# Patient Record
Sex: FEMALE | Race: ASIAN | NOT HISPANIC OR LATINO | Employment: FULL TIME | URBAN - METROPOLITAN AREA
[De-identification: names, ages, dates, MRNs, and addresses within clinical notes are randomized per-mention and may not be internally consistent; named-entity substitution may affect disease eponyms.]

---

## 2019-09-11 ENCOUNTER — RECORDS - HEALTHEAST (OUTPATIENT)
Dept: LAB | Facility: CLINIC | Age: 34
End: 2019-09-11

## 2019-09-11 LAB
CHOLEST SERPL-MCNC: 229 MG/DL
FASTING STATUS PATIENT QL REPORTED: NO
HDLC SERPL-MCNC: 89 MG/DL
LDLC SERPL CALC-MCNC: 118 MG/DL
TRIGL SERPL-MCNC: 108 MG/DL

## 2019-09-12 LAB
HPV SOURCE: NORMAL
HUMAN PAPILLOMA VIRUS 16 DNA: NEGATIVE
HUMAN PAPILLOMA VIRUS 18 DNA: NEGATIVE
HUMAN PAPILLOMA VIRUS FINAL DIAGNOSIS: NORMAL
HUMAN PAPILLOMA VIRUS OTHER HR: NEGATIVE
SPECIMEN DESCRIPTION: NORMAL

## 2022-01-13 ENCOUNTER — LAB REQUISITION (OUTPATIENT)
Dept: LAB | Facility: CLINIC | Age: 37
End: 2022-01-13
Payer: COMMERCIAL

## 2022-01-13 DIAGNOSIS — Z03.818 ENCOUNTER FOR OBSERVATION FOR SUSPECTED EXPOSURE TO OTHER BIOLOGICAL AGENTS RULED OUT: ICD-10-CM

## 2022-01-13 PROCEDURE — U0005 INFEC AGEN DETEC AMPLI PROBE: HCPCS | Mod: ORL | Performed by: PHYSICIAN ASSISTANT

## 2022-01-14 LAB — SARS-COV-2 RNA RESP QL NAA+PROBE: POSITIVE

## 2022-03-24 ENCOUNTER — LAB REQUISITION (OUTPATIENT)
Dept: LAB | Facility: CLINIC | Age: 37
End: 2022-03-24

## 2022-03-24 DIAGNOSIS — Z13.6 ENCOUNTER FOR SCREENING FOR CARDIOVASCULAR DISORDERS: ICD-10-CM

## 2022-03-24 DIAGNOSIS — Z13.1 ENCOUNTER FOR SCREENING FOR DIABETES MELLITUS: ICD-10-CM

## 2022-03-24 LAB
CHOLEST SERPL-MCNC: 184 MG/DL
FASTING STATUS PATIENT QL REPORTED: NORMAL
FASTING STATUS PATIENT QL REPORTED: NORMAL
GLUCOSE BLD-MCNC: 93 MG/DL (ref 70–125)
HDLC SERPL-MCNC: 72 MG/DL
LDLC SERPL CALC-MCNC: 85 MG/DL
TRIGL SERPL-MCNC: 135 MG/DL

## 2022-03-24 PROCEDURE — 80061 LIPID PANEL: CPT | Performed by: FAMILY MEDICINE

## 2022-03-24 PROCEDURE — 82947 ASSAY GLUCOSE BLOOD QUANT: CPT | Performed by: FAMILY MEDICINE

## 2023-11-06 ENCOUNTER — ANCILLARY PROCEDURE (OUTPATIENT)
Dept: MAMMOGRAPHY | Facility: CLINIC | Age: 38
End: 2023-11-06
Attending: FAMILY MEDICINE
Payer: COMMERCIAL

## 2023-11-06 ENCOUNTER — CARE COORDINATION (OUTPATIENT)
Dept: SURGERY | Facility: CLINIC | Age: 38
End: 2023-11-06
Payer: COMMERCIAL

## 2023-11-06 DIAGNOSIS — N63.0 LUMP OR MASS IN BREAST: ICD-10-CM

## 2023-11-06 PROCEDURE — 76642 ULTRASOUND BREAST LIMITED: CPT | Mod: LT

## 2023-11-06 PROCEDURE — 77066 DX MAMMO INCL CAD BI: CPT

## 2023-11-06 PROCEDURE — G0279 TOMOSYNTHESIS, MAMMO: HCPCS

## 2023-11-06 NOTE — PROGRESS NOTES
Per Breast Radiologist request, scheduled patient to see Dr. Kwong  for a surgical consult on 11-13-23 at the [facility].  Patient given RN contact information for questions or concerns.    
30-Apr-2021

## 2023-11-08 ENCOUNTER — ANCILLARY PROCEDURE (OUTPATIENT)
Dept: MAMMOGRAPHY | Facility: CLINIC | Age: 38
End: 2023-11-08
Attending: FAMILY MEDICINE
Payer: COMMERCIAL

## 2023-11-08 DIAGNOSIS — N63.0 LUMP OR MASS IN BREAST: ICD-10-CM

## 2023-11-08 PROCEDURE — 88305 TISSUE EXAM BY PATHOLOGIST: CPT | Mod: 26 | Performed by: PATHOLOGY

## 2023-11-08 PROCEDURE — A4648 IMPLANTABLE TISSUE MARKER: HCPCS

## 2023-11-08 PROCEDURE — 250N000009 HC RX 250

## 2023-11-08 PROCEDURE — 272N000431 US BREAST BIOPSY CORE NEEDLE LEFT

## 2023-11-08 PROCEDURE — 999N000065 MA POST PROCEDURE LEFT

## 2023-11-08 PROCEDURE — 88305 TISSUE EXAM BY PATHOLOGIST: CPT | Mod: TC

## 2023-11-08 RX ORDER — NORELGESTROMIN AND ETHINYL ESTRADIOL 35; 150 UG/D; UG/D
PATCH TRANSDERMAL
COMMUNITY
Start: 2023-10-03

## 2023-11-08 RX ORDER — FLUTICASONE PROPIONATE 50 MCG
SPRAY, SUSPENSION (ML) NASAL
COMMUNITY
Start: 2023-03-21

## 2023-11-08 RX ADMIN — LIDOCAINE HYDROCHLORIDE 10 ML: 10 INJECTION, SOLUTION INFILTRATION; PERINEURAL at 10:58

## 2023-11-09 NOTE — PROGRESS NOTES
"History:  This is a 38 year old female who I'm asked to see by Dr. Islas for evaluation of a left breast mass.  This was picked up by the patient.  She found it about 3 weeks ago.  She denies having any pain, skin changes, or nipple discharge.  She underwent diagnostic mammogram and ultrasound.  This was the first time she had ever had a mammogram.  The imaging demonstrated a 2 cm benign-appearing mass.  Needle biopsy demonstrated a fibroadenoma.    Allergies:  No Known Allergies    Past medical history:  Denies    Past surgical history:  Retropectoral breast implants    Medications:    fluticasone (FLONASE) 50 MCG/ACT nasal spray, 1 spray in each nostril Nasally Once a day for 30 days, Disp: , Rfl:     ZAFEMY 150-35 MCG/24HR patch, CHANGE & APPLY 1 PATCH ONTO THE SKIN ONCE WEEKLY FOR 3 WEEKS, THEN OFF FOR 1 WEEK*, Disp: , Rfl:     Family history:  Maternal grandmother had breast cancer.  Denies any other family history of cancers.    Social history:  Denies alcohol, tobacco, marijuana, and illicit drug use.    Review of Systems:  General: No complaints or constitutional symptoms  Skin: No complaints or symptoms   Hematologic/Lymphatic: No symptoms or complaints  Psychiatric: No symptoms or complaints  Endocrine: No excessive fatigue, no hypermetabolic symptoms reported  Respiratory: No cough, shortness of breath, or wheezing  Cardiovascular: No chest pain or dyspnea on exertion  Breast: Left breast mass  Gastrointestinal: No abdominal pain, nausea, diarrhea, or constipation  Musculoskeletal: No recent injuries reported  Neurological: No focal neurologic defects reported.      Physical Exam:  Ht 1.499 m (4' 11\")   Wt 57.6 kg (127 lb)   BMI 25.65 kg/m    General: Alert, cooperative, appears stated age   Skin: Skin color, texture, turgor normal, no rashes or lesions   Lymphatic: No obvious adenopathy, no swelling   Eyes: No scleral icterus, pupils equal  HENT: No traumatic injury to the head or face, no gross " abnormalities  Lungs: Normal respiratory effort, breath sounds equal bilaterally  Heart: Regular rate and rhythm  Breasts: Bilateral transverse incisions just above the inframammary fold from breast augmentation.  Left breast 2:00 zone 2 has a 1.5 cm superficial mobile mass consistent with a fibroadenoma.  There is ecchymosis lateral to this.  Abdomen: Soft, non-distended and non-tender to palpation  Neurologic: Grossly intact    Imaging:  Pertinent images personally reviewed by myself and discussed with the patient.    Radiology reports:  MA DIAGNOSTIC WITH IMPLANTS BILATERAL W/ ALLYSON AND LEFT BREAST ULTRASOUND  11/6/2023 11:19 AM CST     INDICATION: Left breast lump  COMPARISON: None     MAMMOGRAPHIC FINDINGS: Bilateral full-field digital diagnostic mammograms performed. The breasts are extremely dense, which lowers the sensitivity of mammography. Routine and implant displaced views were obtained with a BB marker placed by the patient location of the lump in the left breast in the 2:00 position in the mid to posterior depth. There are subpectoral implants bilaterally. The right breast appears benign. Directly under the BB in the left breast there is a circumscribed mass. Images evaluated with the assistance of CAD. Breast tomosynthesis was used in interpretation.     ULTRASOUND FINDINGS: Targeted left breast ultrasound was performed in the location of the palpable lump as identified by the patient, in the 2:00 position, 6 cm from the nipple. In this location there was a hypoechoic, homogeneous, circumscribed mass with parallel orientation and increased through transmission measuring 20 x 21 x 12 mm correlating with palpable abnormality and with the lesion seen on the patient's mammogram. The left axilla was examined as well with no evidence of lymphadenopathy.                                                                    IMPRESSION:   Left breast correlates with palpable abnormality in the abnormality seen on  the patient's mammogram. The appearance is most consistent with a benign fibroadenoma. However, given the size, and the patient's age, ultrasound-guided core biopsy for tissue diagnosis is recommended.     ACR BI-RADS Category 4A: Suspicious. Low Suspicion for Malignancy.     The findings and the recommendations were discussed with the patient at the time of exam. She would also like to have the lesion removed. We are helping her with scheduling ultrasound-guided core biopsy of the left breast mass and surgical consultation.    My interpretation:  Well circumscribed benign appearing mass within the US images    Pathology:  LEFT BREAST, 2 O'CLOCK POSITION, 6 CM FROM NIPPLE, ULTRASOUND-GUIDED NEEDLE CORE BIOPSY:  -FIBROADENOMA  -BLUE INK IS CONFIRMED  -NO EVIDENCE OF ATYPIA OR MALIGNANCY    IMPRESSION:  Left breast mass.      PLAN:   Discussed the treatment options for a breast mass of her size.  It would be reasonable to remove this lesion to help rule out an underlying carcinoma or phyllodes tumor.  This is typically an outpatient procedure under local MAC anesthetic.  The risks and benefits of surgery were explained.  Also talked about expected recovery time.  Further treatment will be dependent upon the final pathology.  She is most certainly interested in having this lesion removed.  We'll schedule a left breast excisional biopsy at her earliest convenience.    Ranjana Kwong DO  General Surgeon  [facility]  Breast [facility]  [address] [place] [id number]  Office: [phone]  Employed by - [facility]

## 2023-11-09 NOTE — H&P (VIEW-ONLY)
"History:  This is a 38 year old female who I'm asked to see by Dr. Islas for evaluation of a left breast mass.  This was picked up by the patient.  She found it about 3 weeks ago.  She denies having any pain, skin changes, or nipple discharge.  She underwent diagnostic mammogram and ultrasound.  This was the first time she had ever had a mammogram.  The imaging demonstrated a 2 cm benign-appearing mass.  Needle biopsy demonstrated a fibroadenoma.    Allergies:  No Known Allergies    Past medical history:  Denies    Past surgical history:  Retropectoral breast implants    Medications:    fluticasone (FLONASE) 50 MCG/ACT nasal spray, 1 spray in each nostril Nasally Once a day for 30 days, Disp: , Rfl:     ZAFEMY 150-35 MCG/24HR patch, CHANGE & APPLY 1 PATCH ONTO THE SKIN ONCE WEEKLY FOR 3 WEEKS, THEN OFF FOR 1 WEEK*, Disp: , Rfl:     Family history:  Maternal grandmother had breast cancer.  Denies any other family history of cancers.    Social history:  Denies alcohol, tobacco, marijuana, and illicit drug use.    Review of Systems:  General: No complaints or constitutional symptoms  Skin: No complaints or symptoms   Hematologic/Lymphatic: No symptoms or complaints  Psychiatric: No symptoms or complaints  Endocrine: No excessive fatigue, no hypermetabolic symptoms reported  Respiratory: No cough, shortness of breath, or wheezing  Cardiovascular: No chest pain or dyspnea on exertion  Breast: Left breast mass  Gastrointestinal: No abdominal pain, nausea, diarrhea, or constipation  Musculoskeletal: No recent injuries reported  Neurological: No focal neurologic defects reported.      Physical Exam:  Ht 1.499 m (4' 11\")   Wt 57.6 kg (127 lb)   BMI 25.65 kg/m    General: Alert, cooperative, appears stated age   Skin: Skin color, texture, turgor normal, no rashes or lesions   Lymphatic: No obvious adenopathy, no swelling   Eyes: No scleral icterus, pupils equal  HENT: No traumatic injury to the head or face, no gross " abnormalities  Lungs: Normal respiratory effort, breath sounds equal bilaterally  Heart: Regular rate and rhythm  Breasts: Bilateral transverse incisions just above the inframammary fold from breast augmentation.  Left breast 2:00 zone 2 has a 1.5 cm superficial mobile mass consistent with a fibroadenoma.  There is ecchymosis lateral to this.  Abdomen: Soft, non-distended and non-tender to palpation  Neurologic: Grossly intact    Imaging:  Pertinent images personally reviewed by myself and discussed with the patient.    Radiology reports:  MA DIAGNOSTIC WITH IMPLANTS BILATERAL W/ ALLYSON AND LEFT BREAST ULTRASOUND  11/6/2023 11:19 AM CST     INDICATION: Left breast lump  COMPARISON: None     MAMMOGRAPHIC FINDINGS: Bilateral full-field digital diagnostic mammograms performed. The breasts are extremely dense, which lowers the sensitivity of mammography. Routine and implant displaced views were obtained with a BB marker placed by the patient location of the lump in the left breast in the 2:00 position in the mid to posterior depth. There are subpectoral implants bilaterally. The right breast appears benign. Directly under the BB in the left breast there is a circumscribed mass. Images evaluated with the assistance of CAD. Breast tomosynthesis was used in interpretation.     ULTRASOUND FINDINGS: Targeted left breast ultrasound was performed in the location of the palpable lump as identified by the patient, in the 2:00 position, 6 cm from the nipple. In this location there was a hypoechoic, homogeneous, circumscribed mass with parallel orientation and increased through transmission measuring 20 x 21 x 12 mm correlating with palpable abnormality and with the lesion seen on the patient's mammogram. The left axilla was examined as well with no evidence of lymphadenopathy.                                                                    IMPRESSION:   Left breast correlates with palpable abnormality in the abnormality seen on  the patient's mammogram. The appearance is most consistent with a benign fibroadenoma. However, given the size, and the patient's age, ultrasound-guided core biopsy for tissue diagnosis is recommended.     ACR BI-RADS Category 4A: Suspicious. Low Suspicion for Malignancy.     The findings and the recommendations were discussed with the patient at the time of exam. She would also like to have the lesion removed. We are helping her with scheduling ultrasound-guided core biopsy of the left breast mass and surgical consultation.    My interpretation:  Well circumscribed benign appearing mass within the US images    Pathology:  LEFT BREAST, 2 O'CLOCK POSITION, 6 CM FROM NIPPLE, ULTRASOUND-GUIDED NEEDLE CORE BIOPSY:  -FIBROADENOMA  -BLUE INK IS CONFIRMED  -NO EVIDENCE OF ATYPIA OR MALIGNANCY    IMPRESSION:  Left breast mass.      PLAN:   Discussed the treatment options for a breast mass of her size.  It would be reasonable to remove this lesion to help rule out an underlying carcinoma or phyllodes tumor.  This is typically an outpatient procedure under local MAC anesthetic.  The risks and benefits of surgery were explained.  Also talked about expected recovery time.  Further treatment will be dependent upon the final pathology.  She is most certainly interested in having this lesion removed.  We'll schedule a left breast excisional biopsy at her earliest convenience.    Ranjana Kwong DO  General Surgeon  [facility]  Breast [facility]  [address] [place] [address]  Office: [phone]  Employed by - [facility]

## 2023-11-10 ENCOUNTER — TELEPHONE (OUTPATIENT)
Dept: MAMMOGRAPHY | Facility: CLINIC | Age: 38
End: 2023-11-10
Payer: COMMERCIAL

## 2023-11-10 LAB
PATH REPORT.COMMENTS IMP SPEC: NORMAL
PATH REPORT.FINAL DX SPEC: NORMAL
PATH REPORT.GROSS SPEC: NORMAL
PATH REPORT.MICROSCOPIC SPEC OTHER STN: NORMAL
PATH REPORT.RELEVANT HX SPEC: NORMAL
PHOTO IMAGE: NORMAL

## 2023-11-10 NOTE — TELEPHONE ENCOUNTER
Called patient to inform her of  breast biopsy results from 11/8/23 and recommended follow up from radiologist. No answer.  Left message for patient to call me back.    Ines Bucio, RN, BSN, PHN, CBCN  Breast Center Imaging Nurse Coordinator   [facility]  [address]  [place] [address]  [phone]

## 2023-11-13 ENCOUNTER — OFFICE VISIT (OUTPATIENT)
Dept: SURGERY | Facility: CLINIC | Age: 38
End: 2023-11-13
Attending: FAMILY MEDICINE
Payer: COMMERCIAL

## 2023-11-13 VITALS — HEIGHT: 59 IN | WEIGHT: 127 LBS | BODY MASS INDEX: 25.6 KG/M2

## 2023-11-13 DIAGNOSIS — N63.21 MASS OF UPPER OUTER QUADRANT OF LEFT BREAST: Primary | ICD-10-CM

## 2023-11-13 PROCEDURE — 99213 OFFICE O/P EST LOW 20 MIN: CPT | Performed by: SURGERY

## 2023-11-13 PROCEDURE — 99204 OFFICE O/P NEW MOD 45 MIN: CPT | Performed by: SURGERY

## 2023-11-13 NOTE — LETTER
"    11/13/2023         RE: Yovani Ruff  [address] [address]        Dear Colleague,    Thank you for referring your patient, Yovani Ruff, to the [facility] [place]. Please see a copy of my visit note below.    History:  This is a 38 year old female who I'm asked to see by Dr. Islas for evaluation of a left breast mass.  This was picked up by the patient.  She found it about 3 weeks ago.  She denies having any pain, skin changes, or nipple discharge.  She underwent diagnostic mammogram and ultrasound.  This was the first time she had ever had a mammogram.  The imaging demonstrated a 2 cm benign-appearing mass.  Needle biopsy demonstrated a fibroadenoma.    Allergies:  No Known Allergies    Past medical history:  Denies    Past surgical history:  Retropectoral breast implants    Medications:     fluticasone (FLONASE) 50 MCG/ACT nasal spray, 1 spray in each nostril Nasally Once a day for 30 days, Disp: , Rfl:      ZAFEMY 150-35 MCG/24HR patch, CHANGE & APPLY 1 PATCH ONTO THE SKIN ONCE WEEKLY FOR 3 WEEKS, THEN OFF FOR 1 WEEK*, Disp: , Rfl:     Family history:  Maternal grandmother had breast cancer.  Denies any other family history of cancers.    Social history:  Denies alcohol, tobacco, marijuana, and illicit drug use.    Review of Systems:  General: No complaints or constitutional symptoms  Skin: No complaints or symptoms   Hematologic/Lymphatic: No symptoms or complaints  Psychiatric: No symptoms or complaints  Endocrine: No excessive fatigue, no hypermetabolic symptoms reported  Respiratory: No cough, shortness of breath, or wheezing  Cardiovascular: No chest pain or dyspnea on exertion  Breast: Left breast mass  Gastrointestinal: No abdominal pain, nausea, diarrhea, or constipation  Musculoskeletal: No recent injuries reported  Neurological: No focal neurologic defects reported.      Physical Exam:  Ht 1.499 m (4' 11\")   Wt 57.6 kg (127 lb)   BMI 25.65 kg/m    General: Alert, " cooperative, appears stated age   Skin: Skin color, texture, turgor normal, no rashes or lesions   Lymphatic: No obvious adenopathy, no swelling   Eyes: No scleral icterus, pupils equal  HENT: No traumatic injury to the head or face, no gross abnormalities  Lungs: Normal respiratory effort, breath sounds equal bilaterally  Heart: Regular rate and rhythm  Breasts: Bilateral transverse incisions just above the inframammary fold from breast augmentation.  Left breast 2:00 zone 2 has a 1.5 cm superficial mobile mass consistent with a fibroadenoma.  There is ecchymosis lateral to this.  Abdomen: Soft, non-distended and non-tender to palpation  Neurologic: Grossly intact    Imaging:  Pertinent images personally reviewed by myself and discussed with the patient.    Radiology reports:  MA DIAGNOSTIC WITH IMPLANTS BILATERAL W/ ALLYSON AND LEFT BREAST ULTRASOUND  11/6/2023 11:19 AM CST     INDICATION: Left breast lump  COMPARISON: None     MAMMOGRAPHIC FINDINGS: Bilateral full-field digital diagnostic mammograms performed. The breasts are extremely dense, which lowers the sensitivity of mammography. Routine and implant displaced views were obtained with a BB marker placed by the patient location of the lump in the left breast in the 2:00 position in the mid to posterior depth. There are subpectoral implants bilaterally. The right breast appears benign. Directly under the BB in the left breast there is a circumscribed mass. Images evaluated with the assistance of CAD. Breast tomosynthesis was used in interpretation.     ULTRASOUND FINDINGS: Targeted left breast ultrasound was performed in the location of the palpable lump as identified by the patient, in the 2:00 position, 6 cm from the nipple. In this location there was a hypoechoic, homogeneous, circumscribed mass with parallel orientation and increased through transmission measuring 20 x 21 x 12 mm correlating with palpable abnormality and with the lesion seen on the patient's  mammogram. The left axilla was examined as well with no evidence of lymphadenopathy.                                                                    IMPRESSION:   Left breast correlates with palpable abnormality in the abnormality seen on the patient's mammogram. The appearance is most consistent with a benign fibroadenoma. However, given the size, and the patient's age, ultrasound-guided core biopsy for tissue diagnosis is recommended.     ACR BI-RADS Category 4A: Suspicious. Low Suspicion for Malignancy.     The findings and the recommendations were discussed with the patient at the time of exam. She would also like to have the lesion removed. We are helping her with scheduling ultrasound-guided core biopsy of the left breast mass and surgical consultation.    My interpretation:  Well circumscribed benign appearing mass within the US images    Pathology:  LEFT BREAST, 2 O'CLOCK POSITION, 6 CM FROM NIPPLE, ULTRASOUND-GUIDED NEEDLE CORE BIOPSY:  -FIBROADENOMA  -BLUE INK IS CONFIRMED  -NO EVIDENCE OF ATYPIA OR MALIGNANCY    IMPRESSION:  Left breast mass.      PLAN:   Discussed the treatment options for a breast mass of her size.  It would be reasonable to remove this lesion to help rule out an underlying carcinoma or phyllodes tumor.  This is typically an outpatient procedure under local MAC anesthetic.  The risks and benefits of surgery were explained.  Also talked about expected recovery time.  Further treatment will be dependent upon the final pathology.  She is most certainly interested in having this lesion removed.  We'll schedule a left breast excisional biopsy at her earliest convenience.    Ranjana Kwong DO  General Surgeon  [facility]  Breast [facility]  [address] [place] [address]  Office: [phone]  Employed by - [facility]      Again, thank you for allowing me to participate in the care of your patient.         Sincerely,        Ranjana Kwong, DO

## 2023-11-13 NOTE — NURSING NOTE
Yovani presents to [facility] for a surgical consult with Dr. Kwong  regarding a biopsy proven fibroadenoma. She would like this removed.   RN assessment and EMR update.  Patient met with Dr. Kwong .  See dictation for details of visit. She will plan surgical excision of lesion.  Pre and post op teaching, written and verbal, provided to patient.  Walked her to Kamar/Luis Manuel for surgery scheduling.  Follow up pending biopsy results.

## 2023-11-14 PROBLEM — N63.21 MASS OF UPPER OUTER QUADRANT OF LEFT BREAST: Status: ACTIVE | Noted: 2023-11-13

## 2023-11-17 ENCOUNTER — ANESTHESIA EVENT (OUTPATIENT)
Dept: SURGERY | Facility: AMBULATORY SURGERY CENTER | Age: 38
End: 2023-11-17
Payer: COMMERCIAL

## 2023-11-20 ENCOUNTER — ANESTHESIA (OUTPATIENT)
Dept: SURGERY | Facility: AMBULATORY SURGERY CENTER | Age: 38
End: 2023-11-20
Payer: COMMERCIAL

## 2023-11-20 ENCOUNTER — HOSPITAL ENCOUNTER (OUTPATIENT)
Facility: AMBULATORY SURGERY CENTER | Age: 38
Discharge: HOME | End: 2023-11-20
Attending: SURGERY
Payer: COMMERCIAL

## 2023-11-20 VITALS
SYSTOLIC BLOOD PRESSURE: 108 MMHG | BODY MASS INDEX: 25.6 KG/M2 | WEIGHT: 127 LBS | OXYGEN SATURATION: 98 % | DIASTOLIC BLOOD PRESSURE: 67 MMHG | RESPIRATION RATE: 16 BRPM | HEIGHT: 59 IN | TEMPERATURE: 97.6 F | HEART RATE: 66 BPM

## 2023-11-20 DIAGNOSIS — N63.21 MASS OF UPPER OUTER QUADRANT OF LEFT BREAST: ICD-10-CM

## 2023-11-20 PROCEDURE — 19120 REMOVAL OF BREAST LESION: CPT | Mod: LT | Performed by: SURGERY

## 2023-11-20 RX ORDER — ONDANSETRON 2 MG/ML
4 INJECTION INTRAMUSCULAR; INTRAVENOUS EVERY 30 MIN PRN
Status: DISCONTINUED | OUTPATIENT
Start: 2023-11-20 | End: 2023-11-21 | Stop reason: HOSPADM

## 2023-11-20 RX ORDER — KETOROLAC TROMETHAMINE 15 MG/ML
15 INJECTION, SOLUTION INTRAMUSCULAR; INTRAVENOUS
Status: DISCONTINUED | OUTPATIENT
Start: 2023-11-20 | End: 2023-11-21 | Stop reason: HOSPADM

## 2023-11-20 RX ORDER — HYDROMORPHONE HCL IN WATER/PF 6 MG/30 ML
0.2 PATIENT CONTROLLED ANALGESIA SYRINGE INTRAVENOUS EVERY 5 MIN PRN
Status: DISCONTINUED | OUTPATIENT
Start: 2023-11-20 | End: 2023-11-21 | Stop reason: HOSPADM

## 2023-11-20 RX ORDER — SODIUM CHLORIDE, SODIUM LACTATE, POTASSIUM CHLORIDE, CALCIUM CHLORIDE 600; 310; 30; 20 MG/100ML; MG/100ML; MG/100ML; MG/100ML
INJECTION, SOLUTION INTRAVENOUS CONTINUOUS
Status: DISCONTINUED | OUTPATIENT
Start: 2023-11-20 | End: 2023-11-21 | Stop reason: HOSPADM

## 2023-11-20 RX ORDER — PROPOFOL 10 MG/ML
INJECTION, EMULSION INTRAVENOUS CONTINUOUS PRN
Status: DISCONTINUED | OUTPATIENT
Start: 2023-11-20 | End: 2023-11-20

## 2023-11-20 RX ORDER — DEXAMETHASONE SODIUM PHOSPHATE 4 MG/ML
INJECTION, SOLUTION INTRA-ARTICULAR; INTRALESIONAL; INTRAMUSCULAR; INTRAVENOUS; SOFT TISSUE PRN
Status: DISCONTINUED | OUTPATIENT
Start: 2023-11-20 | End: 2023-11-20

## 2023-11-20 RX ORDER — ONDANSETRON 2 MG/ML
INJECTION INTRAMUSCULAR; INTRAVENOUS PRN
Status: DISCONTINUED | OUTPATIENT
Start: 2023-11-20 | End: 2023-11-20

## 2023-11-20 RX ORDER — OXYCODONE HYDROCHLORIDE 5 MG/1
5 TABLET ORAL
Status: DISCONTINUED | OUTPATIENT
Start: 2023-11-20 | End: 2023-11-21 | Stop reason: HOSPADM

## 2023-11-20 RX ORDER — ONDANSETRON 4 MG/1
4 TABLET, ORALLY DISINTEGRATING ORAL EVERY 30 MIN PRN
Status: DISCONTINUED | OUTPATIENT
Start: 2023-11-20 | End: 2023-11-21 | Stop reason: HOSPADM

## 2023-11-20 RX ORDER — HYDROMORPHONE HCL IN WATER/PF 6 MG/30 ML
0.4 PATIENT CONTROLLED ANALGESIA SYRINGE INTRAVENOUS EVERY 5 MIN PRN
Status: DISCONTINUED | OUTPATIENT
Start: 2023-11-20 | End: 2023-11-21 | Stop reason: HOSPADM

## 2023-11-20 RX ORDER — GLYCOPYRROLATE 0.2 MG/ML
INJECTION, SOLUTION INTRAMUSCULAR; INTRAVENOUS PRN
Status: DISCONTINUED | OUTPATIENT
Start: 2023-11-20 | End: 2023-11-20

## 2023-11-20 RX ORDER — FENTANYL CITRATE 0.05 MG/ML
50 INJECTION, SOLUTION INTRAMUSCULAR; INTRAVENOUS EVERY 5 MIN PRN
Status: DISCONTINUED | OUTPATIENT
Start: 2023-11-20 | End: 2023-11-21 | Stop reason: HOSPADM

## 2023-11-20 RX ORDER — PROPOFOL 10 MG/ML
INJECTION, EMULSION INTRAVENOUS PRN
Status: DISCONTINUED | OUTPATIENT
Start: 2023-11-20 | End: 2023-11-20

## 2023-11-20 RX ORDER — FENTANYL CITRATE 50 UG/ML
INJECTION, SOLUTION INTRAMUSCULAR; INTRAVENOUS PRN
Status: DISCONTINUED | OUTPATIENT
Start: 2023-11-20 | End: 2023-11-20

## 2023-11-20 RX ORDER — FENTANYL CITRATE 0.05 MG/ML
25 INJECTION, SOLUTION INTRAMUSCULAR; INTRAVENOUS EVERY 5 MIN PRN
Status: DISCONTINUED | OUTPATIENT
Start: 2023-11-20 | End: 2023-11-21 | Stop reason: HOSPADM

## 2023-11-20 RX ORDER — LIDOCAINE 40 MG/G
CREAM TOPICAL
Status: DISCONTINUED | OUTPATIENT
Start: 2023-11-20 | End: 2023-11-21 | Stop reason: HOSPADM

## 2023-11-20 RX ORDER — MEPERIDINE HYDROCHLORIDE 25 MG/ML
12.5 INJECTION INTRAMUSCULAR; INTRAVENOUS; SUBCUTANEOUS EVERY 5 MIN PRN
Status: DISCONTINUED | OUTPATIENT
Start: 2023-11-20 | End: 2023-11-21 | Stop reason: HOSPADM

## 2023-11-20 RX ORDER — OXYCODONE HYDROCHLORIDE 10 MG/1
10 TABLET ORAL
Status: DISCONTINUED | OUTPATIENT
Start: 2023-11-20 | End: 2023-11-21 | Stop reason: HOSPADM

## 2023-11-20 RX ORDER — ACETAMINOPHEN 325 MG/1
975 TABLET ORAL ONCE
Status: COMPLETED | OUTPATIENT
Start: 2023-11-20 | End: 2023-11-20

## 2023-11-20 RX ORDER — LIDOCAINE HYDROCHLORIDE 20 MG/ML
INJECTION, SOLUTION INFILTRATION; PERINEURAL PRN
Status: DISCONTINUED | OUTPATIENT
Start: 2023-11-20 | End: 2023-11-20

## 2023-11-20 RX ADMIN — ONDANSETRON 4 MG: 2 INJECTION INTRAMUSCULAR; INTRAVENOUS at 14:15

## 2023-11-20 RX ADMIN — DEXAMETHASONE SODIUM PHOSPHATE 6 MG: 4 INJECTION, SOLUTION INTRA-ARTICULAR; INTRALESIONAL; INTRAMUSCULAR; INTRAVENOUS; SOFT TISSUE at 14:15

## 2023-11-20 RX ADMIN — PROPOFOL 20 MG: 10 INJECTION, EMULSION INTRAVENOUS at 14:16

## 2023-11-20 RX ADMIN — FENTANYL CITRATE 50 MCG: 50 INJECTION, SOLUTION INTRAMUSCULAR; INTRAVENOUS at 14:30

## 2023-11-20 RX ADMIN — ACETAMINOPHEN 975 MG: 325 TABLET ORAL at 13:17

## 2023-11-20 RX ADMIN — Medication 100 MCG: at 14:36

## 2023-11-20 RX ADMIN — SODIUM CHLORIDE, SODIUM LACTATE, POTASSIUM CHLORIDE, CALCIUM CHLORIDE: 600; 310; 30; 20 INJECTION, SOLUTION INTRAVENOUS at 14:51

## 2023-11-20 RX ADMIN — SODIUM CHLORIDE, SODIUM LACTATE, POTASSIUM CHLORIDE, CALCIUM CHLORIDE: 600; 310; 30; 20 INJECTION, SOLUTION INTRAVENOUS at 13:16

## 2023-11-20 RX ADMIN — PROPOFOL 175 MCG/KG/MIN: 10 INJECTION, EMULSION INTRAVENOUS at 14:15

## 2023-11-20 RX ADMIN — FENTANYL CITRATE 25 MCG: 50 INJECTION, SOLUTION INTRAMUSCULAR; INTRAVENOUS at 14:25

## 2023-11-20 RX ADMIN — LIDOCAINE HYDROCHLORIDE 50 MG: 20 INJECTION, SOLUTION INFILTRATION; PERINEURAL at 14:15

## 2023-11-20 RX ADMIN — GLYCOPYRROLATE 0.2 MG: 0.2 INJECTION, SOLUTION INTRAMUSCULAR; INTRAVENOUS at 14:15

## 2023-11-20 RX ADMIN — Medication 100 MCG: at 14:31

## 2023-11-20 RX ADMIN — FENTANYL CITRATE 25 MCG: 50 INJECTION, SOLUTION INTRAMUSCULAR; INTRAVENOUS at 14:34

## 2023-11-20 RX ADMIN — PROPOFOL 10 MG: 10 INJECTION, EMULSION INTRAVENOUS at 14:18

## 2023-11-20 NOTE — INTERVAL H&P NOTE
Patient seen in preop.  Denies new medical history since he was last seen.  All questions answered regarding procedure.  Consent obtained.  To the OR for left breast excisional biopsy.    Ranjana Kwong DO  General Surgeon  [facility]  Breast [place] - [address] [address]  Office: [phone]  Employed by - [facility]

## 2023-11-20 NOTE — ANESTHESIA PREPROCEDURE EVALUATION
"Anesthesia Pre-Procedure Evaluation    Patient: Yovani Ruff   MRN: [id number] : 1985        Procedure : Procedure(s):  BREAST EXCISIONAL BIOPSY          History reviewed. No pertinent past medical history.   History reviewed. No pertinent surgical history.   No Known Allergies   Social History     Tobacco Use    Smoking status: Never     Passive exposure: Never    Smokeless tobacco: Never   Substance Use Topics    Alcohol use: Yes     Comment: rarely      Wt Readings from Last 1 Encounters:   23 57.6 kg (127 lb)        Anesthesia Evaluation            ROS/MED HX  ENT/Pulmonary:  - neg pulmonary ROS     Neurologic:  - neg neurologic ROS     Cardiovascular:  - neg cardiovascular ROS     METS/Exercise Tolerance: >4 METS    Hematologic:  - neg hematologic  ROS     Musculoskeletal:  - neg musculoskeletal ROS     GI/Hepatic:  - neg GI/hepatic ROS     Renal/Genitourinary:  - neg Renal ROS     Endo:  - neg endo ROS     Psychiatric/Substance Use:  - neg psychiatric ROS     Infectious Disease:  - neg infectious disease ROS     Malignancy:  - neg malignancy ROS     Other:  - neg other ROS          Physical Exam    Airway  airway exam normal      Mallampati: III   TM distance: > 3 FB   Neck ROM: full   Mouth opening: > 3 cm    Respiratory Devices and Support         Dental       (+) Completely normal teeth      Cardiovascular   cardiovascular exam normal          Pulmonary   pulmonary exam normal                OUTSIDE LABS:  CBC: No results found for: \"WBC\", \"HGB\", \"HCT\", \"PLT\"  BMP:   Lab Results   Component Value Date    GLC 93 2022     COAGS: No results found for: \"PTT\", \"INR\", \"FIBR\"  POC: No results found for: \"BGM\", \"HCG\", \"HCGS\"  HEPATIC: No results found for: \"ALBUMIN\", \"PROTTOTAL\", \"ALT\", \"AST\", \"GGT\", \"ALKPHOS\", \"BILITOTAL\", \"BILIDIRECT\", \"CORY\"  OTHER: No results found for: \"PH\", \"LACT\", \"A1C\", \"CUONG\", \"PHOS\", \"MAG\", \"LIPASE\", \"AMYLASE\", \"TSH\", \"T4\", \"T3\", \"CRP\", \"SED\"    Anesthesia Plan    ASA " Status:  1       Anesthesia Type: MAC.     - Reason for MAC: straight local not clinically adequate, immobility needed   Induction: Intravenous, Propofol.   Maintenance: TIVA.        Consents    Anesthesia Plan(s) and associated risks, benefits, and realistic alternatives discussed. Questions answered and patient/representative(s) expressed understanding.     - Discussed: Risks, Benefits and Alternatives for BOTH SEDATION and the PROCEDURE were discussed     - Discussed with:  Patient            Postoperative Care    Pain management: IV analgesics.   PONV prophylaxis: Ondansetron (or other 5HT-3), Dexamethasone or Solumedrol     Comments:    Other Comments: MAC - propofol gtt, fentanyl/ketamine bolus PRN for pain  Decadron/Zofran for antiemesis  Convert to General with LMA if unable to maintain adequate SpO2 on reduced FiO2 (<30%)  Fire precautions  Reviewed anesthetic options and risks. Patient agrees to proceed              Wayne Garcia MD

## 2023-11-20 NOTE — DISCHARGE INSTRUCTIONS
You have received 975 mg of Acetaminophen (Tylenol) at 1:17PM. Please do not take an additional dose of Tylenol until after 7:17PM     Do not exceed 4,000 mg of acetaminophen during a 24 hour period and keep in mind that acetaminophen can also be found in many over-the-counter cold medications as well as narcotics that may be given for pain.        If you have any questions or concerns regarding your procedure, please contact Dr. Ranjana Kwong, her office number is [phone].     [facility]   Adult Discharge Orders & Instructions     For 24 hours after surgery    Get plenty of rest.  A responsible adult must stay with you for at least 24 hours after you leave the hospital.   Do not drive or use heavy equipment.  If you have weakness or tingling, don't drive or use heavy equipment until this feeling goes away.  Do not drink alcohol.  Avoid strenuous or risky activities.  Ask for help when climbing stairs.   You may feel lightheaded.  IF so, sit for a few minutes before standing.  Have someone help you get up.   If you have nausea (feel sick to your stomach): Drink only clear liquids such as apple juice, ginger ale, broth or 7-Up.  Rest may also help.  Be sure to drink enough fluids.  Move to a regular diet as you feel able.  You may have a slight fever. Call the doctor if your fever is over 100 F (37.7 C) (taken under the tongue) or lasts longer than 24 hours.  You may have a dry mouth, a sore throat, muscle aches or trouble sleeping.  These should go away after 24 hours.  Do not make important or legal decisions.   Call your doctor for any of the followin.  Signs of infection (fever, growing tenderness at the surgery site, a large amount of drainage or bleeding, severe pain, foul-smelling drainage, redness, swelling).    2. It has been over 8 to 10 hours since surgery and you are still not able to urinate (pass water).    3.  Headache for over 24 hours.

## 2023-11-20 NOTE — ANESTHESIA POSTPROCEDURE EVALUATION
Patient: Yovani Ruff    Procedure: Procedure(s):  BREAST EXCISIONAL BIOPSY       Anesthesia Type:  MAC    Note:  Disposition: Outpatient   Postop Pain Control: Uneventful            Sign Out: Well controlled pain   PONV: No   Neuro/Psych: Uneventful            Sign Out: Acceptable/Baseline neuro status   Airway/Respiratory: Uneventful            Sign Out: Acceptable/Baseline resp. status   CV/Hemodynamics: Uneventful            Sign Out: Acceptable CV status; No obvious hypovolemia; No obvious fluid overload   Other NRE: NONE   DID A NON-ROUTINE EVENT OCCUR? No           Last vitals:  Vitals Value Taken Time   /67 11/20/23 1515   Temp 97.6  F (36.4  C) 11/20/23 1451   Pulse 68 11/20/23 1519   Resp 16 11/20/23 1451   SpO2 98 % 11/20/23 1519   Vitals shown include unfiled device data.    Electronically Signed By: Wayne Garcia MD  November 20, 2023  3:23 PM

## 2023-11-20 NOTE — PROGRESS NOTES
Offered patient pregnancy test.  Explained there are risks associated with anesthesia and pregnancy.  Patient verbalizes understanding, denies possibility of pregnancy and declines pregnancy test.    Veronica Campuzano RN on 11/20/2023 at 1:45 PM

## 2023-11-20 NOTE — OP NOTE
Name:  Yovani Ruff  PCP:  Timothy Michelle  Procedure Date:  11/20/2023      LEFT BREAST EXCISIONAL BIOPSY      Pre-Procedure Diagnosis:  Mass of upper outer quadrant of left breast     Post-Procedure Diagnosis:    Same    Anesthesia Type:    MAC with local    Estimated Blood Loss:   2 mL    Specimens:    Left breast mass    Complications:    None apparent    Indication for procedure:  This is a 38-year-old female who recently felt a mass within the left breast.  Her work-up demonstrated findings consistent with a fibroadenoma.  In order to rule out an underlying phyllodes tumor, and prevent continued growth, she has elected for removal of this lesion.    Operative Report:    After informed consent was obtained, and the risks and benefits of the procedure were discussed, the patient was brought back to the operative suite and placed in the supine position.  MAC anesthesia was provided by the department of anesthesia.  A timeout was performed and the left breast and axilla were prepped and draped in the usual sterile fashion.  1% lidocaine mixed with quarter percent Marcaine was injected over the palpable lesion in the far upper outer quadrant.  A curvilinear incision was made directly over the mass at 2:00 6 cm from the nipple.  Electrocautery was used to dissect through the subcutaneous tissues and around the palpable mass.  The mass appeared to be a fibroadenoma.  The mass was sent off for permanent sectioning.  Hemostasis was assured within the wound.  The wound was then closed in a layered fashion utilizing interrupted 3-0 Vicryl for the breast tissue, interrupted 4-0 Vicryl for the deep dermis, followed by a running subcuticular 4-0 Monocryl and Dermabond to the skin.    Disposition:  The patient tolerated the procedure well, and was transferred to the postanesthesia care unit in stable condition.      Ranjana Kwong DO  General Surgeon  [facility]  [address]  [address]  [place] [address]  Office: [phone]  Employed by [facility]

## 2023-11-20 NOTE — ANESTHESIA CARE TRANSFER NOTE
Patient: Yovani Ruff    Procedure: Procedure(s):  BREAST EXCISIONAL BIOPSY       Diagnosis: Mass of upper outer quadrant of left breast [N63.21]  Diagnosis Additional Information: No value filed.    Anesthesia Type:   MAC     Note:    Oropharynx: oropharynx clear of all foreign objects and spontaneously breathing  Level of Consciousness: awake  Oxygen Supplementation: face mask  Level of Supplemental Oxygen (L/min / FiO2): 6  Independent Airway: airway patency satisfactory and stable  Dentition: dentition unchanged  Vital Signs Stable: post-procedure vital signs reviewed and stable  Report to RN Given: handoff report given  Patient transferred to: Phase II    Handoff Report: Identifed the Patient, Identified the Reponsible Provider, Reviewed the pertinent medical history, Discussed the surgical course, Reviewed Intra-OP anesthesia mangement and issues during anesthesia, Set expectations for post-procedure period and Allowed opportunity for questions and acknowledgement of understanding  Vitals:  Vitals Value Taken Time   BP 97/61 11/20/23 1451   Temp 98.6  F (37  C) 11/20/23 1451   Pulse 75 11/20/23 1451   Resp 12 11/20/23 1451   SpO2 99 % 11/20/23 1451       Electronically Signed By: SHAR Izquierdo CRNA  November 20, 2023  2:52 PM

## 2023-12-30 ENCOUNTER — HEALTH MAINTENANCE LETTER (OUTPATIENT)
Age: 38
End: 2023-12-30

## 2024-02-28 ENCOUNTER — LAB REQUISITION (OUTPATIENT)
Dept: LAB | Facility: CLINIC | Age: 39
End: 2024-02-28

## 2024-02-28 DIAGNOSIS — Z01.419 ENCOUNTER FOR GYNECOLOGICAL EXAMINATION (GENERAL) (ROUTINE) WITHOUT ABNORMAL FINDINGS: ICD-10-CM

## 2024-02-28 DIAGNOSIS — Z00.00 ENCOUNTER FOR GENERAL ADULT MEDICAL EXAMINATION WITHOUT ABNORMAL FINDINGS: ICD-10-CM

## 2024-02-28 PROCEDURE — G0145 SCR C/V CYTO,THINLAYER,RESCR: HCPCS | Performed by: FAMILY MEDICINE

## 2024-02-28 PROCEDURE — 80061 LIPID PANEL: CPT | Performed by: FAMILY MEDICINE

## 2024-02-28 PROCEDURE — 87624 HPV HI-RISK TYP POOLED RSLT: CPT | Performed by: FAMILY MEDICINE

## 2024-02-29 LAB
CHOLEST SERPL-MCNC: 233 MG/DL
FASTING STATUS PATIENT QL REPORTED: ABNORMAL
HDLC SERPL-MCNC: 84 MG/DL
LDLC SERPL CALC-MCNC: 122 MG/DL
NONHDLC SERPL-MCNC: 149 MG/DL
TRIGL SERPL-MCNC: 136 MG/DL

## 2024-03-04 LAB
BKR LAB AP GYN ADEQUACY: NORMAL
BKR LAB AP GYN INTERPRETATION: NORMAL
BKR LAB AP HPV REFLEX: NORMAL
BKR LAB AP LMP: NORMAL
BKR LAB AP PREVIOUS ABNORMAL: NORMAL
PATH REPORT.COMMENTS IMP SPEC: NORMAL
PATH REPORT.COMMENTS IMP SPEC: NORMAL
PATH REPORT.RELEVANT HX SPEC: NORMAL

## 2024-03-05 LAB
HUMAN PAPILLOMA VIRUS 16 DNA: NEGATIVE
HUMAN PAPILLOMA VIRUS 18 DNA: NEGATIVE
HUMAN PAPILLOMA VIRUS FINAL DIAGNOSIS: NORMAL
HUMAN PAPILLOMA VIRUS OTHER HR: NEGATIVE

## 2025-01-19 ENCOUNTER — HEALTH MAINTENANCE LETTER (OUTPATIENT)
Age: 40
End: 2025-01-19

## 2025-08-16 ENCOUNTER — OFFICE VISIT (OUTPATIENT)
Dept: URGENT CARE | Facility: URGENT CARE | Age: 40
End: 2025-08-16
Payer: COMMERCIAL

## 2025-08-16 VITALS
HEART RATE: 74 BPM | RESPIRATION RATE: 16 BRPM | WEIGHT: 136.8 LBS | SYSTOLIC BLOOD PRESSURE: 132 MMHG | TEMPERATURE: 98 F | BODY MASS INDEX: 27.63 KG/M2 | OXYGEN SATURATION: 97 % | DIASTOLIC BLOOD PRESSURE: 95 MMHG
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
## 2025-08-16 PROCEDURE — 99203 OFFICE O/P NEW LOW 30 MIN: CPT | Mod: 25 | Performed by: FAMILY MEDICINE

## 2025-08-16 PROCEDURE — 90471 IMMUNIZATION ADMIN: CPT | Performed by: FAMILY MEDICINE

## 2025-08-16 PROCEDURE — 3075F SYST BP GE 130 - 139MM HG: CPT | Performed by: FAMILY MEDICINE

## 2025-08-16 PROCEDURE — 3080F DIAST BP >= 90 MM HG: CPT | Performed by: FAMILY MEDICINE

## 2025-08-16 PROCEDURE — 90715 TDAP VACCINE 7 YRS/> IM: CPT | Performed by: FAMILY MEDICINE

## 2025-08-20 ENCOUNTER — APPOINTMENT (OUTPATIENT)
Dept: RADIOLOGY | Facility: HOSPITAL | Age: 40
End: 2025-08-20
Payer: COMMERCIAL

## 2025-08-20 ENCOUNTER — OFFICE VISIT (OUTPATIENT)
Dept: URGENT CARE | Facility: URGENT CARE | Age: 40
End: 2025-08-20
Payer: COMMERCIAL

## 2025-08-20 ENCOUNTER — HOSPITAL ENCOUNTER (INPATIENT)
Facility: HOSPITAL | Age: 40
End: 2025-08-20
Attending: EMERGENCY MEDICINE | Admitting: FAMILY MEDICINE
Payer: COMMERCIAL

## 2025-08-20 VITALS
WEIGHT: 136 LBS | DIASTOLIC BLOOD PRESSURE: 90 MMHG | HEIGHT: 59 IN | OXYGEN SATURATION: 96 % | BODY MASS INDEX: 27.42 KG/M2 | SYSTOLIC BLOOD PRESSURE: 130 MMHG | RESPIRATION RATE: 19 BRPM | HEART RATE: 63 BPM | TEMPERATURE: 98.7 F

## 2025-08-20 DIAGNOSIS — M65.949 TENOSYNOVITIS OF HAND: Primary | ICD-10-CM

## 2025-08-20 DIAGNOSIS — L03.113 CELLULITIS OF RIGHT HAND: Primary | ICD-10-CM

## 2025-08-20 DIAGNOSIS — L03.011 CELLULITIS OF FINGER OF RIGHT HAND: ICD-10-CM

## 2025-08-20 LAB
ANION GAP SERPL CALCULATED.3IONS-SCNC: 10 MMOL/L (ref 7–15)
BASOPHILS # BLD AUTO: 0.03 10E3/UL (ref 0–0.2)
BASOPHILS NFR BLD AUTO: 0.3 %
BUN SERPL-MCNC: 11.1 MG/DL (ref 6–20)
CALCIUM SERPL-MCNC: 9.4 MG/DL (ref 8.8–10.4)
CHLORIDE SERPL-SCNC: 102 MMOL/L (ref 98–107)
CREAT SERPL-MCNC: 0.59 MG/DL (ref 0.51–0.95)
CRP SERPL-MCNC: 48.9 MG/L
EGFRCR SERPLBLD CKD-EPI 2021: >90 ML/MIN/1.73M2
EOSINOPHIL # BLD AUTO: 0.09 10E3/UL (ref 0–0.7)
EOSINOPHIL NFR BLD AUTO: 1 %
ERYTHROCYTE [DISTWIDTH] IN BLOOD BY AUTOMATED COUNT: 12 % (ref 10–15)
GLUCOSE SERPL-MCNC: 107 MG/DL (ref 70–99)
HCO3 SERPL-SCNC: 24 MMOL/L (ref 22–29)
HCT VFR BLD AUTO: 41.7 % (ref 35–47)
HGB BLD-MCNC: 14.2 G/DL (ref 11.7–15.7)
IMM GRANULOCYTES # BLD: 0.04 10E3/UL
IMM GRANULOCYTES NFR BLD: 0.5 %
LACTATE SERPL-SCNC: 0.8 MMOL/L (ref 0.7–2)
LYMPHOCYTES # BLD AUTO: 1.35 10E3/UL (ref 0.8–5.3)
LYMPHOCYTES NFR BLD AUTO: 15.4 %
MCH RBC QN AUTO: 29.3 PG (ref 26.5–33)
MCHC RBC AUTO-ENTMCNC: 34.1 G/DL (ref 31.5–36.5)
MCV RBC AUTO: 86.2 FL (ref 78–100)
MONOCYTES # BLD AUTO: 0.91 10E3/UL (ref 0–1.3)
MONOCYTES NFR BLD AUTO: 10.4 %
NEUTROPHILS # BLD AUTO: 6.36 10E3/UL (ref 1.6–8.3)
NEUTROPHILS NFR BLD AUTO: 72.4 %
NRBC # BLD AUTO: <0.03 10E3/UL
NRBC BLD AUTO-RTO: 0 /100
PLATELET # BLD AUTO: 232 10E3/UL (ref 150–450)
POTASSIUM SERPL-SCNC: 4.1 MMOL/L (ref 3.4–5.3)
RBC # BLD AUTO: 4.84 10E6/UL (ref 3.8–5.2)
SODIUM SERPL-SCNC: 136 MMOL/L (ref 135–145)
WBC # BLD AUTO: 8.78 10E3/UL (ref 4–11)

## 2025-08-20 PROCEDURE — 99214 OFFICE O/P EST MOD 30 MIN: CPT | Performed by: PHYSICIAN ASSISTANT

## 2025-08-20 PROCEDURE — 96365 THER/PROPH/DIAG IV INF INIT: CPT

## 2025-08-20 PROCEDURE — 250N000011 HC RX IP 250 OP 636

## 2025-08-20 PROCEDURE — 73130 X-RAY EXAM OF HAND: CPT | Mod: RT

## 2025-08-20 PROCEDURE — 3075F SYST BP GE 130 - 139MM HG: CPT | Performed by: PHYSICIAN ASSISTANT

## 2025-08-20 PROCEDURE — 99285 EMERGENCY DEPT VISIT HI MDM: CPT | Mod: 25

## 2025-08-20 PROCEDURE — 87070 CULTURE OTHR SPECIMN AEROBIC: CPT | Performed by: PHYSICIAN ASSISTANT

## 2025-08-20 PROCEDURE — 3080F DIAST BP >= 90 MM HG: CPT | Performed by: PHYSICIAN ASSISTANT

## 2025-08-20 PROCEDURE — 36415 COLL VENOUS BLD VENIPUNCTURE: CPT

## 2025-08-20 PROCEDURE — 83605 ASSAY OF LACTIC ACID: CPT

## 2025-08-20 PROCEDURE — 87040 BLOOD CULTURE FOR BACTERIA: CPT

## 2025-08-20 PROCEDURE — 85004 AUTOMATED DIFF WBC COUNT: CPT

## 2025-08-20 PROCEDURE — 99222 1ST HOSP IP/OBS MODERATE 55: CPT | Mod: AI

## 2025-08-20 PROCEDURE — 80048 BASIC METABOLIC PNL TOTAL CA: CPT

## 2025-08-20 PROCEDURE — 86140 C-REACTIVE PROTEIN: CPT

## 2025-08-20 PROCEDURE — 120N000001 HC R&B MED SURG/OB

## 2025-08-20 RX ORDER — AMPICILLIN AND SULBACTAM 2; 1 G/1; G/1
3 INJECTION, POWDER, FOR SOLUTION INTRAMUSCULAR; INTRAVENOUS EVERY 6 HOURS
Status: DISCONTINUED | OUTPATIENT
Start: 2025-08-20 | End: 2025-08-22 | Stop reason: HOSPADM

## 2025-08-20 RX ORDER — AMPICILLIN AND SULBACTAM 2; 1 G/1; G/1
3 INJECTION, POWDER, FOR SOLUTION INTRAMUSCULAR; INTRAVENOUS ONCE
Status: COMPLETED | OUTPATIENT
Start: 2025-08-20 | End: 2025-08-20

## 2025-08-20 RX ORDER — CALCIUM CARBONATE 500 MG/1
1000 TABLET, CHEWABLE ORAL 4 TIMES DAILY PRN
Status: DISCONTINUED | OUTPATIENT
Start: 2025-08-20 | End: 2025-08-22 | Stop reason: HOSPADM

## 2025-08-20 RX ORDER — CEFTRIAXONE SODIUM 2 G
0.35 VIAL (EA) INJECTION ONCE
Status: DISCONTINUED | OUTPATIENT
Start: 2025-08-20 | End: 2025-08-20

## 2025-08-20 RX ORDER — SULFAMETHOXAZOLE AND TRIMETHOPRIM 800; 160 MG/1; MG/1
1 TABLET ORAL 2 TIMES DAILY
Qty: 20 TABLET | Refills: 0 | Status: CANCELLED | OUTPATIENT
Start: 2025-08-20 | End: 2025-08-30

## 2025-08-20 RX ORDER — ACETAMINOPHEN 650 MG/1
650 SUPPOSITORY RECTAL EVERY 4 HOURS PRN
Status: DISCONTINUED | OUTPATIENT
Start: 2025-08-20 | End: 2025-08-22 | Stop reason: HOSPADM

## 2025-08-20 RX ORDER — AMOXICILLIN 250 MG
1 CAPSULE ORAL 2 TIMES DAILY PRN
Status: DISCONTINUED | OUTPATIENT
Start: 2025-08-20 | End: 2025-08-22 | Stop reason: HOSPADM

## 2025-08-20 RX ORDER — OXYCODONE HYDROCHLORIDE 5 MG/1
5 TABLET ORAL EVERY 4 HOURS PRN
Refills: 0 | Status: DISCONTINUED | OUTPATIENT
Start: 2025-08-20 | End: 2025-08-22 | Stop reason: HOSPADM

## 2025-08-20 RX ORDER — NALOXONE HYDROCHLORIDE 0.4 MG/ML
0.2 INJECTION, SOLUTION INTRAMUSCULAR; INTRAVENOUS; SUBCUTANEOUS
Status: DISCONTINUED | OUTPATIENT
Start: 2025-08-20 | End: 2025-08-22 | Stop reason: HOSPADM

## 2025-08-20 RX ORDER — LIDOCAINE 40 MG/G
CREAM TOPICAL
Status: DISCONTINUED | OUTPATIENT
Start: 2025-08-20 | End: 2025-08-22 | Stop reason: HOSPADM

## 2025-08-20 RX ORDER — NALOXONE HYDROCHLORIDE 0.4 MG/ML
0.4 INJECTION, SOLUTION INTRAMUSCULAR; INTRAVENOUS; SUBCUTANEOUS
Status: DISCONTINUED | OUTPATIENT
Start: 2025-08-20 | End: 2025-08-22 | Stop reason: HOSPADM

## 2025-08-20 RX ORDER — ACETAMINOPHEN 325 MG/1
650 TABLET ORAL EVERY 4 HOURS PRN
Status: DISCONTINUED | OUTPATIENT
Start: 2025-08-20 | End: 2025-08-22 | Stop reason: HOSPADM

## 2025-08-20 RX ORDER — AMOXICILLIN 250 MG
2 CAPSULE ORAL 2 TIMES DAILY PRN
Status: DISCONTINUED | OUTPATIENT
Start: 2025-08-20 | End: 2025-08-22 | Stop reason: HOSPADM

## 2025-08-20 RX ADMIN — AMPICILLIN SODIUM AND SULBACTAM SODIUM 3 G: 2; 1 INJECTION, POWDER, FOR SOLUTION INTRAMUSCULAR; INTRAVENOUS at 19:39

## 2025-08-20 RX ADMIN — AMPICILLIN SODIUM AND SULBACTAM SODIUM 3 G: 2; 1 INJECTION, POWDER, FOR SOLUTION INTRAMUSCULAR; INTRAVENOUS at 12:41

## 2025-08-20 ASSESSMENT — ACTIVITIES OF DAILY LIVING (ADL)
ADLS_ACUITY_SCORE: 41

## 2025-08-20 ASSESSMENT — COLUMBIA-SUICIDE SEVERITY RATING SCALE - C-SSRS
6. HAVE YOU EVER DONE ANYTHING, STARTED TO DO ANYTHING, OR PREPARED TO DO ANYTHING TO END YOUR LIFE?: NO
1. IN THE PAST MONTH, HAVE YOU WISHED YOU WERE DEAD OR WISHED YOU COULD GO TO SLEEP AND NOT WAKE UP?: NO
2. HAVE YOU ACTUALLY HAD ANY THOUGHTS OF KILLING YOURSELF IN THE PAST MONTH?: NO

## 2025-08-21 ENCOUNTER — APPOINTMENT (OUTPATIENT)
Dept: MRI IMAGING | Facility: HOSPITAL | Age: 40
End: 2025-08-21
Attending: PHYSICIAN ASSISTANT
Payer: COMMERCIAL

## 2025-08-21 VITALS
BODY MASS INDEX: 26.59 KG/M2 | HEART RATE: 65 BPM | TEMPERATURE: 98.6 F | WEIGHT: 131.9 LBS | HEIGHT: 59 IN | DIASTOLIC BLOOD PRESSURE: 79 MMHG | OXYGEN SATURATION: 97 % | SYSTOLIC BLOOD PRESSURE: 127 MMHG | RESPIRATION RATE: 18 BRPM

## 2025-08-21 LAB
ANION GAP SERPL CALCULATED.3IONS-SCNC: 13 MMOL/L (ref 7–15)
BACTERIA SPEC CULT: NORMAL
BACTERIA SPEC CULT: NORMAL
BACTERIA WND CULT: NORMAL
BUN SERPL-MCNC: 8.2 MG/DL (ref 6–20)
CALCIUM SERPL-MCNC: 8.8 MG/DL (ref 8.8–10.4)
CHLORIDE SERPL-SCNC: 102 MMOL/L (ref 98–107)
CREAT SERPL-MCNC: 0.54 MG/DL (ref 0.51–0.95)
CRP SERPL-MCNC: 41.6 MG/L
EGFRCR SERPLBLD CKD-EPI 2021: >90 ML/MIN/1.73M2
ERYTHROCYTE [DISTWIDTH] IN BLOOD BY AUTOMATED COUNT: 12 % (ref 10–15)
GLUCOSE SERPL-MCNC: 95 MG/DL (ref 70–99)
HCO3 SERPL-SCNC: 22 MMOL/L (ref 22–29)
HCT VFR BLD AUTO: 38.3 % (ref 35–47)
HGB BLD-MCNC: 13.6 G/DL (ref 11.7–15.7)
MCH RBC QN AUTO: 30.2 PG (ref 26.5–33)
MCHC RBC AUTO-ENTMCNC: 35.5 G/DL (ref 31.5–36.5)
MCV RBC AUTO: 84.9 FL (ref 78–100)
PLATELET # BLD AUTO: 226 10E3/UL (ref 150–450)
POTASSIUM SERPL-SCNC: 4 MMOL/L (ref 3.4–5.3)
RBC # BLD AUTO: 4.51 10E6/UL (ref 3.8–5.2)
SODIUM SERPL-SCNC: 137 MMOL/L (ref 135–145)
WBC # BLD AUTO: 7.08 10E3/UL (ref 4–11)

## 2025-08-21 PROCEDURE — 250N000011 HC RX IP 250 OP 636

## 2025-08-21 PROCEDURE — 36415 COLL VENOUS BLD VENIPUNCTURE: CPT

## 2025-08-21 PROCEDURE — 255N000002 HC RX 255 OP 636: Performed by: PHYSICIAN ASSISTANT

## 2025-08-21 PROCEDURE — 120N000001 HC R&B MED SURG/OB

## 2025-08-21 PROCEDURE — 86140 C-REACTIVE PROTEIN: CPT

## 2025-08-21 PROCEDURE — 99232 SBSQ HOSP IP/OBS MODERATE 35: CPT | Mod: GC

## 2025-08-21 PROCEDURE — 73220 MRI UPPR EXTREMITY W/O&W/DYE: CPT | Mod: RT

## 2025-08-21 PROCEDURE — A9585 GADOBUTROL INJECTION: HCPCS | Performed by: PHYSICIAN ASSISTANT

## 2025-08-21 PROCEDURE — 85027 COMPLETE CBC AUTOMATED: CPT

## 2025-08-21 PROCEDURE — 80048 BASIC METABOLIC PNL TOTAL CA: CPT

## 2025-08-21 RX ORDER — GADOBUTROL 604.72 MG/ML
6 INJECTION INTRAVENOUS ONCE
Status: COMPLETED | OUTPATIENT
Start: 2025-08-21 | End: 2025-08-21

## 2025-08-21 RX ADMIN — GADOBUTROL 6 ML: 604.72 INJECTION INTRAVENOUS at 22:32

## 2025-08-21 RX ADMIN — AMPICILLIN SODIUM AND SULBACTAM SODIUM 3 G: 2; 1 INJECTION, POWDER, FOR SOLUTION INTRAMUSCULAR; INTRAVENOUS at 02:44

## 2025-08-21 RX ADMIN — AMPICILLIN SODIUM AND SULBACTAM SODIUM 3 G: 2; 1 INJECTION, POWDER, FOR SOLUTION INTRAMUSCULAR; INTRAVENOUS at 08:34

## 2025-08-21 RX ADMIN — AMPICILLIN SODIUM AND SULBACTAM SODIUM 3 G: 2; 1 INJECTION, POWDER, FOR SOLUTION INTRAMUSCULAR; INTRAVENOUS at 20:08

## 2025-08-21 RX ADMIN — AMPICILLIN SODIUM AND SULBACTAM SODIUM 3 G: 2; 1 INJECTION, POWDER, FOR SOLUTION INTRAMUSCULAR; INTRAVENOUS at 14:02

## 2025-08-21 ASSESSMENT — ACTIVITIES OF DAILY LIVING (ADL)
ADLS_ACUITY_SCORE: 41
ADLS_ACUITY_SCORE: 48
ADLS_ACUITY_SCORE: 48
ADLS_ACUITY_SCORE: 41
ADLS_ACUITY_SCORE: 48
ADLS_ACUITY_SCORE: 48
ADLS_ACUITY_SCORE: 41
ADLS_ACUITY_SCORE: 48
ADLS_ACUITY_SCORE: 41
ADLS_ACUITY_SCORE: 41
ADLS_ACUITY_SCORE: 48
ADLS_ACUITY_SCORE: 41
ADLS_ACUITY_SCORE: 48
ADLS_ACUITY_SCORE: 48
ADLS_ACUITY_SCORE: 41
ADLS_ACUITY_SCORE: 48
ADLS_ACUITY_SCORE: 48
ADLS_ACUITY_SCORE: 41
ADLS_ACUITY_SCORE: 48

## 2025-08-22 VITALS
TEMPERATURE: 98.4 F | RESPIRATION RATE: 18 BRPM | WEIGHT: 131.9 LBS | BODY MASS INDEX: 26.59 KG/M2 | OXYGEN SATURATION: 98 % | DIASTOLIC BLOOD PRESSURE: 73 MMHG | HEART RATE: 62 BPM | SYSTOLIC BLOOD PRESSURE: 112 MMHG | HEIGHT: 59 IN

## 2025-08-22 LAB
CRP SERPL-MCNC: 27.3 MG/L
ERYTHROCYTE [DISTWIDTH] IN BLOOD BY AUTOMATED COUNT: 11.9 % (ref 10–15)
HCT VFR BLD AUTO: 37.6 % (ref 35–47)
HGB BLD-MCNC: 13.5 G/DL (ref 11.7–15.7)
MCH RBC QN AUTO: 30.5 PG (ref 26.5–33)
MCHC RBC AUTO-ENTMCNC: 35.9 G/DL (ref 31.5–36.5)
MCV RBC AUTO: 84.9 FL (ref 78–100)
PLATELET # BLD AUTO: 211 10E3/UL (ref 150–450)
RBC # BLD AUTO: 4.43 10E6/UL (ref 3.8–5.2)
WBC # BLD AUTO: 6.28 10E3/UL (ref 4–11)

## 2025-08-22 PROCEDURE — 99238 HOSP IP/OBS DSCHRG MGMT 30/<: CPT | Mod: GC

## 2025-08-22 PROCEDURE — 85018 HEMOGLOBIN: CPT

## 2025-08-22 PROCEDURE — 86140 C-REACTIVE PROTEIN: CPT

## 2025-08-22 PROCEDURE — 250N000011 HC RX IP 250 OP 636

## 2025-08-22 PROCEDURE — 36415 COLL VENOUS BLD VENIPUNCTURE: CPT

## 2025-08-22 RX ADMIN — AMPICILLIN SODIUM AND SULBACTAM SODIUM 3 G: 2; 1 INJECTION, POWDER, FOR SOLUTION INTRAMUSCULAR; INTRAVENOUS at 09:06

## 2025-08-22 RX ADMIN — AMPICILLIN SODIUM AND SULBACTAM SODIUM 3 G: 2; 1 INJECTION, POWDER, FOR SOLUTION INTRAMUSCULAR; INTRAVENOUS at 02:48

## 2025-08-22 ASSESSMENT — ACTIVITIES OF DAILY LIVING (ADL)
ADLS_ACUITY_SCORE: 25
ADLS_ACUITY_SCORE: 25
ADLS_ACUITY_SCORE: 48
ADLS_ACUITY_SCORE: 48
ADLS_ACUITY_SCORE: 25
ADLS_ACUITY_SCORE: 25
ADLS_ACUITY_SCORE: 48
ADLS_ACUITY_SCORE: 25
ADLS_ACUITY_SCORE: 48
ADLS_ACUITY_SCORE: 25
ADLS_ACUITY_SCORE: 25

## 2025-08-24 LAB
BACTERIA WND CULT: ABNORMAL
BACTERIA WND CULT: ABNORMAL

## 2025-08-25 LAB
BACTERIA SPEC CULT: NO GROWTH
BACTERIA SPEC CULT: NO GROWTH